# Patient Record
Sex: FEMALE | NOT HISPANIC OR LATINO | URBAN - METROPOLITAN AREA
[De-identification: names, ages, dates, MRNs, and addresses within clinical notes are randomized per-mention and may not be internally consistent; named-entity substitution may affect disease eponyms.]

---

## 2017-01-26 ENCOUNTER — IMPORTED ENCOUNTER (OUTPATIENT)
Dept: URBAN - METROPOLITAN AREA CLINIC 1 | Facility: CLINIC | Age: 57
End: 2017-01-26

## 2017-01-26 PROBLEM — Z79.84: Noted: 2017-01-26

## 2017-01-26 PROBLEM — H25.813: Noted: 2017-01-26

## 2017-01-26 PROBLEM — H40.013: Noted: 2017-01-26

## 2017-01-26 PROBLEM — E11.9: Noted: 2017-01-26

## 2017-01-26 PROBLEM — H35.371: Noted: 2017-01-26

## 2017-01-26 PROBLEM — H04.123: Noted: 2017-01-26

## 2017-01-26 PROCEDURE — 92083 EXTENDED VISUAL FIELD XM: CPT

## 2017-01-26 PROCEDURE — 92014 COMPRE OPH EXAM EST PT 1/>: CPT

## 2017-01-26 PROCEDURE — 92015 DETERMINE REFRACTIVE STATE: CPT

## 2017-01-26 PROCEDURE — 92133 CPTRZD OPH DX IMG PST SGM ON: CPT

## 2017-01-26 NOTE — PATIENT DISCUSSION
1.  Cataract OU: Observe for now without intervention. The patient was advised to contact us if any change or worsening of vision2. Dry Eyes OU -- REC patient to cont using Restasis BID OU3. DM Type II without sign of diabetic retinopathy and no blot heme on dilated retinal examination today OU No Macular Edema:  Discussed the pathophysiology of diabetes and its effect on the eye and risk of blindness. Stressed the importance of strong glucose control. Advised of importance of at least yearly dilated examinations but to contact us immediately for any problems or concerns. 4. Type II diabetes controlled by oral medications. 5.  COAG Suspect OU: (0.75/0.70)  IOP was 19/21.  -Fm HX. Condition was discussed with patient. Will monitor patient for progression. OCT/VF was ordered and done today results was normal 6. Epiretinal Membrane OD - Observe for change. 7. Return for an appointment in 1 year for 30 and OCT. with Dr. Yoav Kearney.

## 2018-01-31 ENCOUNTER — IMPORTED ENCOUNTER (OUTPATIENT)
Dept: URBAN - METROPOLITAN AREA CLINIC 1 | Facility: CLINIC | Age: 58
End: 2018-01-31

## 2018-01-31 PROBLEM — H16.143: Noted: 2018-01-31

## 2018-01-31 PROBLEM — H04.123: Noted: 2018-01-31

## 2018-01-31 PROBLEM — H43.813: Noted: 2018-01-31

## 2018-01-31 PROBLEM — Z79.84: Noted: 2018-01-31

## 2018-01-31 PROBLEM — H40.013: Noted: 2018-01-31

## 2018-01-31 PROBLEM — H25.813: Noted: 2018-01-31

## 2018-01-31 PROBLEM — E11.9: Noted: 2018-01-31

## 2018-01-31 PROCEDURE — 92014 COMPRE OPH EXAM EST PT 1/>: CPT

## 2018-01-31 PROCEDURE — 92015 DETERMINE REFRACTIVE STATE: CPT

## 2018-01-31 PROCEDURE — 92133 CPTRZD OPH DX IMG PST SGM ON: CPT

## 2018-01-31 NOTE — PATIENT DISCUSSION
1.  DM Type II (Oral Medication) without sign of diabetic retinopathy and no blot heme on dilated retinal examination today OU No Macular Edema:  Discussed the pathophysiology of diabetes and its effect on the eye and risk of blindness. Stressed the importance of strong glucose control. Advised of importance of at least yearly dilated examinations but to contact us immediately for any problems or concerns. 2. Glaucoma Suspect OU (CD 0.75/0.70): OCT WNL OU. Patient is considered Low Risk. Condition was discussed with patient and patient understands. Will continue to monitor patient for any progression in condition. Patient was advised to call us with any problems questions or concerns. 3.  Cataract OU:  Visually Significant secondary to glare discussed the risks benefits alternatives and limitations of cataract surgery. The patient stated a full understanding and a desire to proceed with the procedure. The patient will need to return for preop appointment with cataract measurements and to have any additional questions answered and start pre-operative eye drops as directed. **Guarded prognosis for vision potential due to s/p ERM peel OD. **Phaco PCL OD then OS (patient is right handed s/p ERM peel OD) Otherwise follow-up4. MALLORY w/ PEK OU- No longer taking Restasis. Recommend ATs BID-QID OU routinely 5. PVD OU - RD precautions. 6.  S/p ERM peel OD - followed by Dr. Yehuda Barrera for an appointment for [place]/H and P[place] with Dr. Violette Nicolas.

## 2019-02-20 ENCOUNTER — IMPORTED ENCOUNTER (OUTPATIENT)
Dept: URBAN - METROPOLITAN AREA CLINIC 1 | Facility: CLINIC | Age: 59
End: 2019-02-20

## 2019-02-20 PROBLEM — Z96.1: Noted: 2019-02-20

## 2019-02-20 PROBLEM — H25.812: Noted: 2019-02-20

## 2019-02-20 PROBLEM — E11.9: Noted: 2019-02-20

## 2019-02-20 PROBLEM — H40.023: Noted: 2019-02-20

## 2019-02-20 PROBLEM — Z79.4: Noted: 2019-02-20

## 2019-02-20 PROCEDURE — 92133 CPTRZD OPH DX IMG PST SGM ON: CPT

## 2019-02-20 PROCEDURE — 92015 DETERMINE REFRACTIVE STATE: CPT

## 2019-02-20 PROCEDURE — 92014 COMPRE OPH EXAM EST PT 1/>: CPT

## 2019-02-20 NOTE — PATIENT DISCUSSION
1.  DM Type II (Insulin) mellitus without sign of diabetic retinopathy and no blot heme on dilated retinal examination today OU No Macular Edema:  Discussed the pathophysiology of diabetes and its effect on the eye and risk of blindness. Stressed the importance of strong glucose control. Advised of importance of at least yearly dilated examinations but to contact us immediately for any problems or concerns. 2. Glaucoma Suspect OU: (CD:0.75/0.70) IOP: 19/20. AA. Neg Family hx. Patient is considered high risk. OCT today shows minimal thinning OD WNL OS. Condition was discussed with patient and patient understands. Will continue to monitor patient for any progression in condition. Patient was advised to call us with any problems questions or concerns. 3.  Cataract OS: Observe for now without intervention. The patient was advised to contact us if any change or worsening of vision4. Pseudophakia OD: Doing well. Done with Dr. Josie Hale July 2018. 5. MALLORY w/ PEK OU: No longer taking Restasis. Recommend continue ATs BID-QID OU routinely. 6. PVD OU: RD precautions. 7.  S/p ERM peel OD: followed by Dr. Ranjeet Lowe glasses Mrx today. Letter sent to PCP. Return for an appointment in 1 year for a 30 with Dr. Hakan Dos Santos.

## 2019-09-05 NOTE — PATIENT DISCUSSION
Cataract symptoms i.e., glare, blur discussed. Pt to call if worsening vision or trouble with driving, TV, reading, ADL. UV precautions. Reviewed possibility of future cataract surgery. Referral done in [address] [place] [address]

## 2020-02-19 ENCOUNTER — IMPORTED ENCOUNTER (OUTPATIENT)
Dept: URBAN - METROPOLITAN AREA CLINIC 1 | Facility: CLINIC | Age: 60
End: 2020-02-19

## 2020-02-19 PROBLEM — E11.9: Noted: 2020-02-19

## 2020-02-19 PROBLEM — H25.812: Noted: 2020-02-19

## 2020-02-19 PROBLEM — H40.023: Noted: 2020-02-19

## 2020-02-19 PROBLEM — Z79.4: Noted: 2020-02-19

## 2020-02-19 PROBLEM — Z96.1: Noted: 2020-02-19

## 2020-02-19 PROCEDURE — 92014 COMPRE OPH EXAM EST PT 1/>: CPT

## 2020-02-19 PROCEDURE — 92015 DETERMINE REFRACTIVE STATE: CPT

## 2020-02-19 NOTE — PATIENT DISCUSSION
1.  DM Type II (Insulin) mellitus without sign of diabetic retinopathy and no blot heme on dilated retinal examination today OU No Macular Edema. Discussed the pathophysiology of diabetes and its effect on the eye and risk of blindness. Stressed the importance of strong glucose control. Advised of importance of at least yearly dilated examinations but to contact us immediately for any problems or concerns. 2. Cataract OS -- Visually Significant secondary to glare however patient does not desire Phaco at this time. Will cont to observe. 3. Glaucoma Suspect OU: (CD:0.75/0.70) IOP: 19/20. AA. Neg Family hx. Patient is considered high risk. Condition was discussed with patient and patient understands. Will continue to monitor patient for any progression in condition. Patient was advised to call us with any problems questions or concerns4. Pseudophakia OD -- Dr. Cook Co July 2018. 5. MALLORY w/ PEK OU -- No longer taking Restasis. Recommend continue ATs BID-QID OU routinely. 6. PVD OU -- RD precautions. 7.  S/p ERM peel OD -- followed by Dr. Alvaro Shoemaker sent to PCP. Return for an appointment 1 year for a 30/OCT/glare Dr. Dominique Estrella.

## 2020-05-22 ENCOUNTER — IMPORTED ENCOUNTER (OUTPATIENT)
Dept: URBAN - METROPOLITAN AREA CLINIC 1 | Facility: CLINIC | Age: 60
End: 2020-05-22

## 2020-05-22 PROBLEM — H01.001: Noted: 2020-05-22

## 2020-05-22 PROBLEM — H01.004: Noted: 2020-05-22

## 2020-05-22 PROBLEM — H10.023: Noted: 2020-05-22

## 2020-05-22 PROCEDURE — 92012 INTRM OPH EXAM EST PATIENT: CPT

## 2020-05-22 NOTE — PATIENT DISCUSSION
1.  Bacterial Conjunctivitis OU OD>OS--The condition was discussed with the patient. Tobradex OD QID(Erxd)  The mechanism of transmission was explained. The patient was advised to wash his hands and use separate towels and bedding. 2. Anterior Blepharitis OU - Daily Hot compresses and lid scrubs were recommended. 3. Return for an appointment in 1 week for 10 with Dr. Joan Thurman.

## 2022-04-02 ASSESSMENT — VISUAL ACUITY
OS_GLARE: 20/80
OD_SC: 20/25
OS_GLARE: 20/80
OS_SC: 20/30
OS_SC: 20/20
OS_GLARE: 20/80
OS_SC: 20/20
OD_GLARE: 20/80
OS_SC: 20/40+2
OU_CC: J1
OD_GLARE: 20/80
OD_SC: 20/50
OS_SC: 20/40+2
OD_SC: 20/20-1
OD_SC: 20/150
OD_SC: 20/50+2

## 2022-04-02 ASSESSMENT — TONOMETRY
OD_IOP_MMHG: 18
OS_IOP_MMHG: 17
OD_IOP_MMHG: 19
OS_IOP_MMHG: 19
OS_IOP_MMHG: 20
OD_IOP_MMHG: 7
OS_IOP_MMHG: 21
OS_IOP_MMHG: 19
OD_IOP_MMHG: 19
OD_IOP_MMHG: 19